# Patient Record
Sex: FEMALE | Race: WHITE | NOT HISPANIC OR LATINO | URBAN - METROPOLITAN AREA
[De-identification: names, ages, dates, MRNs, and addresses within clinical notes are randomized per-mention and may not be internally consistent; named-entity substitution may affect disease eponyms.]

---

## 2022-06-06 ENCOUNTER — APPOINTMENT (RX ONLY)
Dept: URBAN - METROPOLITAN AREA CLINIC 308 | Facility: CLINIC | Age: 32
Setting detail: DERMATOLOGY
End: 2022-06-06

## 2022-06-06 DIAGNOSIS — L259 CONTACT DERMATITIS AND OTHER ECZEMA, UNSPECIFIED CAUSE: ICD-10-CM | Status: INADEQUATELY CONTROLLED

## 2022-06-06 PROBLEM — L23.9 ALLERGIC CONTACT DERMATITIS, UNSPECIFIED CAUSE: Status: ACTIVE | Noted: 2022-06-06

## 2022-06-06 PROCEDURE — ? COUNSELING

## 2022-06-06 PROCEDURE — ? PRESCRIPTION

## 2022-06-06 PROCEDURE — 99203 OFFICE O/P NEW LOW 30 MIN: CPT

## 2022-06-06 PROCEDURE — ? ORDER FOR PATCH TESTING

## 2022-06-06 RX ORDER — TRIAMCINOLONE ACETONIDE 1 MG/G
THIN LAYER OINTMENT TOPICAL
Qty: 30 | Refills: 2 | Status: ERX | COMMUNITY
Start: 2022-06-06

## 2022-06-06 RX ADMIN — TRIAMCINOLONE ACETONIDE THIN LAYER: 1 OINTMENT TOPICAL at 00:00

## 2022-06-06 NOTE — HPI: EVALUATION OF SKIN LESION(S)
What Type Of Note Output Would You Prefer (Optional)?: Bullet Format
Hpi Title: Evaluation of Skin Lesions
How Severe Are Your Spot(S)?: moderate
Have Your Spot(S) Been Treated In The Past?: has been treated
Additional History: Dryness/irritation around both eyes, moved here from [place] in 1/2022, changed make-up/ skin care.

## 2022-06-06 NOTE — PROCEDURE: ORDER FOR PATCH TESTING
Counseling: I discussed the timing of the procedure and ensured the patient understands that this test requires multiple visits. No topical steroids applied should be applied to the patch testing location and no oral prednisone for two week prior to the test. While the patches are in place they should be kept dry which will limit bathing, swimming an exercise. I also explained that it is common for testing to be negative and this doesn't mean there isn't a allergic reaction occurring. During the testing itching is common.
Location Patches Should Be Applied: Back
Patch Test Reading Schedule: First Reading at 48 hours and Second Reading at 72 hours
Detail Level: Simple
Patch Test To Be Applied: TRUE test

## 2022-08-15 ENCOUNTER — APPOINTMENT (RX ONLY)
Dept: URBAN - METROPOLITAN AREA CLINIC 312 | Facility: CLINIC | Age: 32
Setting detail: DERMATOLOGY
End: 2022-08-15

## 2022-08-15 DIAGNOSIS — L259 CONTACT DERMATITIS AND OTHER ECZEMA, UNSPECIFIED CAUSE: ICD-10-CM

## 2022-08-15 PROBLEM — L23.9 ALLERGIC CONTACT DERMATITIS, UNSPECIFIED CAUSE: Status: ACTIVE | Noted: 2022-08-15

## 2022-08-15 PROCEDURE — ? PATCH TESTING

## 2022-08-15 PROCEDURE — 95044 PATCH/APPLICATION TESTS: CPT

## 2022-08-15 ASSESSMENT — LOCATION SIMPLE DESCRIPTION DERM: LOCATION SIMPLE: UPPER BACK

## 2022-08-15 ASSESSMENT — LOCATION DETAILED DESCRIPTION DERM: LOCATION DETAILED: INFERIOR THORACIC SPINE

## 2022-08-15 ASSESSMENT — LOCATION ZONE DERM: LOCATION ZONE: TRUNK

## 2022-08-15 NOTE — PROCEDURE: PATCH TESTING
Number Of Patches (Maximum Allowable Per Dos By Cms Is 90): 79
Consent: Written consent obtained, risks reviewed including but not limited to rash, itching, allergic reaction, systemic rash, remote possiblity of anaphylaxis to allergen.
Post-Care Instructions: I reviewed with the patient in detail post-care instructions. Patient should not sweat, pick at, or get the patches wet for 48 hours.
Detail Level: None

## 2022-08-17 ENCOUNTER — APPOINTMENT (RX ONLY)
Dept: URBAN - METROPOLITAN AREA CLINIC 308 | Facility: CLINIC | Age: 32
Setting detail: DERMATOLOGY
End: 2022-08-17

## 2022-08-17 DIAGNOSIS — L259 CONTACT DERMATITIS AND OTHER ECZEMA, UNSPECIFIED CAUSE: ICD-10-CM

## 2022-08-17 PROBLEM — L23.9 ALLERGIC CONTACT DERMATITIS, UNSPECIFIED CAUSE: Status: ACTIVE | Noted: 2022-08-17

## 2022-08-17 PROCEDURE — ? PATCH TEST REMOVAL

## 2022-08-17 PROCEDURE — ? ADDITIONAL NOTES

## 2022-08-19 ENCOUNTER — APPOINTMENT (RX ONLY)
Dept: URBAN - METROPOLITAN AREA CLINIC 308 | Facility: CLINIC | Age: 32
Setting detail: DERMATOLOGY
End: 2022-08-19

## 2022-08-19 DIAGNOSIS — L259 CONTACT DERMATITIS AND OTHER ECZEMA, UNSPECIFIED CAUSE: ICD-10-CM

## 2022-08-19 PROBLEM — L23.9 ALLERGIC CONTACT DERMATITIS, UNSPECIFIED CAUSE: Status: ACTIVE | Noted: 2022-08-19

## 2022-08-19 PROCEDURE — ? ADDITIONAL NOTES

## 2022-08-19 PROCEDURE — 99212 OFFICE O/P EST SF 10 MIN: CPT

## 2022-08-19 PROCEDURE — ? NORTH AMERICAN 80 PATCH TEST READING

## 2022-08-19 NOTE — PROCEDURE: [employer] [employer] 80 PATCH TEST READING
Allergen 66 Reaction: no reaction
Name Of Allergen 15: black rubber mix - PPD
Name Of Allergen 71: benzyl alcohol
Show Allergen Counseling In The Note?: Yes
Name Of Allergen 49: coconut diethanolamide (cocamide BETI)
Name Of Allergen 16: imidazolidinyl urea (Germall® 115)
Name Of Allergen 66: phenoxyethanol
Name Of Allergen 70: 2-ethylhexyl-4-methoxycinnamate (octinoxate)
Name Of Allergen 43: tea tree oil, oxidized
Number Of Patches Read: 80
Name Of Allergen 63: clobetasol-17-propionate
Name Of Allergen 67: disperse orange-3
Name Of Allergen 60: cetylstearylalcohol
Name Of Allergen 12: thiuram mix
Detail Level: Zone
Name Of Allergen 79: amidoamine (stearamidopropyl dimethylamine)
Name Of Allergen 77: oleamidopropyl dimethylamine
Name Of Allergen 33: disperse blue mix
Name Of Allergen 19: 2-mercaptobenzothiazole
Name Of Allergen 58: sorbitan sesquioleate
Name Of Allergen 3: fragrance mix
Name Of Allergen 30: lidocaine-HCl
Name Of Allergen 20: colophony
Name Of Allergen 31: gold sodium thiosulfate
Name Of Allergen 1: nickel sulfate hexahydrate
What Reading Time Point?: 96 hour
Name Of Allergen 57: ethyleneurea, melamine formaldehyde mix
Name Of Allergen 22: ethylenediamine dihydrochloride
Name Of Allergen 45: propolis
Name Of Allergen 76: dimethylaminopropylamine (DMAPA)
Name Of Allergen 9: 4-phenylenediamine base
Name Of Allergen 72: formaldehyde
Name Of Allergen 41: cinnamic aldehyde
Name Of Allergen 59: 1,3-diphenylguanidine (DPG)
Name Of Allergen 56: compositae mix
Name Of Allergen 36: iodopropynyl butylcarbamate
Name Of Allergen 18: tixocortol-21-pivalate
Name Of Allergen 53: triclosan
Name Of Allergen 42: ethyl acrylate
Name Of Allergen 47: tosylamide formaldehyde resin
Name Of Allergen 2: balsam of peru (myroxylon pereirae resin)
Name Of Allergen 28: parteunolide
Name Of Allergen 10: potassium dichromate
Name Of Allergen 75: cocamidopropyl betaine
Name Of Allergen 46: 2-hydroxy-4-methoxybenzophenone (oxybenzone)
Name Of Allergen 29: 2-bromo-2-nitropropane-1,3-diol (bronopol™)
Name Of Allergen 13: diazolidinyl urea (Germall® II)
Name Of Allergen 26: DMDM hydantoin
Name Of Allergen 44: 4-chloro-3,5-xylenol (PCMX)
Name Of Allergen 8: v-cfyc-phwlgxjlopc formaldehyde resin
Name Of Allergen 50: 4-chloro-3-cresol (PCMC)
Allergen 1 Reaction: 1+
Name Of Allergen 17: mercapto mix
Name Of Allergen 7: cobalt (II) chloride hexahydrate
Name Of Allergen 39: decyl glucoside
Name Of Allergen 51: benzalkonium chloride
Name Of Allergen 40: methyl methacrylate
Name Of Allergen 64: dl alpha tocopherol
Name Of Allergen 38: 2-hydroxyethyl methacrylate (HEMA)
Name Of Allergen 25: bacitracin
Name Of Allergen 24: benzocaine
Name Of Allergen 68: jasminum officinale oil (jasminum grandiflorum)
Name Of Allergen 11: carba mix
Name Of Allergen 62: triamcinolone acetonide
Name Of Allergen 55: cananga odorata (alma marquez)
Allergen 36 Reaction: irritant reaction
Name Of Allergen 80: chlorhexidine digluconate
Name Of Allergen 27: cinchocaine-HCl
Name Of Allergen 54: sorbic acid
Name Of Allergen 23: amerchol L101
Name Of Allergen 73: methylchloroisothiazolinone/methylisothiazolinone
Name Of Allergen 4: quaternium 15
Name Of Allergen 69: butylhydroxytoluene (BHT)
Name Of Allergen 61: glutaraldehyde
Name Of Allergen 21: bisphenol A epoxy resin
Name Of Allergen 48: sesquiterpenelactone mix
Name Of Allergen 37: mixed dialkyl thioureas
Name Of Allergen 78: propylene glycol
Name Of Allergen 14: paraben mix
Name Of Allergen 34: hydrocortisone-17-butyrate
Name Of Allergen 52: benzophenone-4
Name Of Allergen 6: budesonide
Name Of Allergen 5: neomycin sulfate
Name Of Allergen 32: methyldibromo glutaronitrile (MDBGN)
Name Of Allergen 35: fragrance mix II
Name Of Allergen 74: methylisothiazolinone
Name Of Allergen 65: ethyl cyanoacrylate
Allergen 6 Reaction: 2+

## 2024-06-14 ENCOUNTER — APPOINTMENT (RX ONLY)
Dept: URBAN - METROPOLITAN AREA CLINIC 308 | Facility: CLINIC | Age: 34
Setting detail: DERMATOLOGY
End: 2024-06-14

## 2024-06-14 DIAGNOSIS — D18.0 HEMANGIOMA: ICD-10-CM

## 2024-06-14 DIAGNOSIS — D22 MELANOCYTIC NEVI: ICD-10-CM

## 2024-06-14 DIAGNOSIS — L81.4 OTHER MELANIN HYPERPIGMENTATION: ICD-10-CM

## 2024-06-14 DIAGNOSIS — L82.1 OTHER SEBORRHEIC KERATOSIS: ICD-10-CM

## 2024-06-14 PROBLEM — D22.5 MELANOCYTIC NEVI OF TRUNK: Status: ACTIVE | Noted: 2024-06-14

## 2024-06-14 PROBLEM — D23.62 OTHER BENIGN NEOPLASM OF SKIN OF LEFT UPPER LIMB, INCLUDING SHOULDER: Status: ACTIVE | Noted: 2024-06-14

## 2024-06-14 PROBLEM — D18.01 HEMANGIOMA OF SKIN AND SUBCUTANEOUS TISSUE: Status: ACTIVE | Noted: 2024-06-14

## 2024-06-14 PROCEDURE — ? ADDITIONAL NOTES

## 2024-06-14 PROCEDURE — ? ELECTRODESICCATION (COSMETIC)

## 2024-06-14 PROCEDURE — 99213 OFFICE O/P EST LOW 20 MIN: CPT

## 2024-06-14 PROCEDURE — ? FULL BODY SKIN EXAM

## 2024-06-14 PROCEDURE — ? TREATMENT REGIMEN

## 2024-06-14 PROCEDURE — ? COUNSELING

## 2024-06-14 ASSESSMENT — LOCATION ZONE DERM
LOCATION ZONE: TRUNK
LOCATION ZONE: ARM
LOCATION ZONE: FACE

## 2024-06-14 ASSESSMENT — LOCATION SIMPLE DESCRIPTION DERM
LOCATION SIMPLE: RIGHT UPPER BACK
LOCATION SIMPLE: RIGHT BACK
LOCATION SIMPLE: RIGHT SHOULDER
LOCATION SIMPLE: CHEST
LOCATION SIMPLE: LEFT CHEEK
LOCATION SIMPLE: LEFT SHOULDER
LOCATION SIMPLE: ABDOMEN

## 2024-06-14 ASSESSMENT — LOCATION DETAILED DESCRIPTION DERM
LOCATION DETAILED: LEFT ANTERIOR SHOULDER
LOCATION DETAILED: LEFT INFERIOR LATERAL MALAR CHEEK
LOCATION DETAILED: RIGHT ANTERIOR SHOULDER
LOCATION DETAILED: PERIUMBILICAL SKIN
LOCATION DETAILED: LEFT INFERIOR CENTRAL MALAR CHEEK
LOCATION DETAILED: RIGHT SUPERIOR LATERAL UPPER BACK
LOCATION DETAILED: LEFT MEDIAL SUPERIOR CHEST
LOCATION DETAILED: RIGHT SUPERIOR UPPER BACK

## 2024-06-14 NOTE — PROCEDURE: ELECTRODESICCATION (COSMETIC)
Anesthesia Type: 1% lidocaine with epinephrine
Post-Care Instructions: I reviewed with the patient in detail post-care instructions. Patient is to wear sunprotection, and avoid picking at any of the treated lesions. Pt may apply Vaseline to crusted or scabbing areas
Price (Use Numbers Only, No Special Characters Or $): 0
Consent: The patient's consent was obtained including but not limited to risks of crusting, scabbing, blistering, scarring, darker or lighter pigmentary change, recurrence, incomplete removal and infection.
Detail Level: Zone

## 2024-06-14 NOTE — HPI: EVALUATION OF SKIN LESION(S)
What Type Of Note Output Would You Prefer (Optional)?: Bullet Format
Hpi Title: Evaluation of Skin Lesions
Family Member: Mother, father
Additional History: Pt has lesion on left cheek that is bleeding

## 2025-05-19 ENCOUNTER — APPOINTMENT (OUTPATIENT)
Dept: URBAN - METROPOLITAN AREA CLINIC 308 | Facility: CLINIC | Age: 35
Setting detail: DERMATOLOGY
End: 2025-05-19

## 2025-05-19 DIAGNOSIS — L92.0 GRANULOMA ANNULARE: ICD-10-CM | Status: INADEQUATELY CONTROLLED

## 2025-05-19 PROCEDURE — ? PRESCRIPTION MEDICATION MANAGEMENT

## 2025-05-19 PROCEDURE — ? PRESCRIPTION

## 2025-05-19 PROCEDURE — ? COUNSELING

## 2025-05-19 PROCEDURE — ? OBSERVATION

## 2025-05-19 PROCEDURE — 99213 OFFICE O/P EST LOW 20 MIN: CPT

## 2025-05-19 RX ORDER — CLOBETASOL PROPIONATE 0.5 MG/G
CREAM TOPICAL BID
Qty: 60 | Refills: 2 | Status: ERX | COMMUNITY
Start: 2025-05-19

## 2025-05-19 RX ADMIN — CLOBETASOL PROPIONATE: 0.5 CREAM TOPICAL at 00:00

## 2025-05-19 ASSESSMENT — LOCATION DETAILED DESCRIPTION DERM
LOCATION DETAILED: LEFT ULNAR DORSAL HAND
LOCATION DETAILED: RIGHT RADIAL DORSAL HAND

## 2025-05-19 ASSESSMENT — LOCATION SIMPLE DESCRIPTION DERM
LOCATION SIMPLE: RIGHT HAND
LOCATION SIMPLE: LEFT HAND

## 2025-05-19 ASSESSMENT — LOCATION ZONE DERM: LOCATION ZONE: HAND

## 2025-05-19 NOTE — PROCEDURE: PRESCRIPTION MEDICATION MANAGEMENT
Detail Level: Zone
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 DO NOT APPLY TO FACE, GROIN, OR ARMPITS
Render In Strict Bullet Format?: No

## 2025-05-19 NOTE — HPI: SKIN LESION
What Type Of Note Output Would You Prefer (Optional)?: Standard Output
Has Your Skin Lesion Been Treated?: not been treated
Is This A New Presentation, Or A Follow-Up?: Skin Lesion
Additional History: Tried lotion and aquaphor, did not improve the lesion.

## 2025-06-02 ENCOUNTER — APPOINTMENT (OUTPATIENT)
Dept: URBAN - METROPOLITAN AREA CLINIC 308 | Facility: CLINIC | Age: 35
Setting detail: DERMATOLOGY
End: 2025-06-02

## 2025-06-02 DIAGNOSIS — L92.0 GRANULOMA ANNULARE: ICD-10-CM | Status: INADEQUATELY CONTROLLED

## 2025-06-02 PROBLEM — L30.9 DERMATITIS, UNSPECIFIED: Status: ACTIVE | Noted: 2025-06-02

## 2025-06-02 PROCEDURE — ? OTC TREATMENT REGIMEN

## 2025-06-02 PROCEDURE — ? PATIENT SPECIFIC COUNSELING

## 2025-06-02 PROCEDURE — ? PRESCRIPTION

## 2025-06-02 PROCEDURE — ? PRESCRIPTION MEDICATION MANAGEMENT

## 2025-06-02 PROCEDURE — 99214 OFFICE O/P EST MOD 30 MIN: CPT

## 2025-06-02 PROCEDURE — ? COUNSELING

## 2025-06-02 RX ORDER — CLOBETASOL PROPIONATE 0.5 MG/G
OINTMENT TOPICAL
Qty: 15 | Refills: 1 | Status: ERX | COMMUNITY
Start: 2025-06-02

## 2025-06-02 RX ADMIN — CLOBETASOL PROPIONATE: 0.5 OINTMENT TOPICAL at 00:00

## 2025-06-02 ASSESSMENT — LOCATION DETAILED DESCRIPTION DERM: LOCATION DETAILED: LEFT ULNAR DORSAL HAND

## 2025-06-02 ASSESSMENT — LOCATION ZONE DERM: LOCATION ZONE: HAND

## 2025-06-02 ASSESSMENT — LOCATION SIMPLE DESCRIPTION DERM: LOCATION SIMPLE: LEFT HAND

## 2025-06-02 NOTE — PROCEDURE: PRESCRIPTION MEDICATION MANAGEMENT
Detail Level: Zone
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 DO NOT APPLY TO FACE, GROIN, OR ARMPITS
Render In Strict Bullet Format?: No

## 2025-06-02 NOTE — PROCEDURE: OTC TREATMENT REGIMEN
Detail Level: Zone
Samples Given: cereve cream
Patient Specific Otc Recommendations (Will Not Stick From Patient To Patient): cerve cream 1-2x per day

## 2025-06-02 NOTE — PROCEDURE: PATIENT SPECIFIC COUNSELING
Detail Level: Zone
Patient presents with suspected GA, but hands appear to be extremely dry and irritated. Discussed one more week of topical clobetasol with heavy moisturizing and will follow up. Will consider bx at f/u if not improved. will switch from clobetasol cream to clobetasol ointment. Called pt after appt and told her we will try ointment. She expressed understanding